# Patient Record
Sex: MALE | URBAN - METROPOLITAN AREA
[De-identification: names, ages, dates, MRNs, and addresses within clinical notes are randomized per-mention and may not be internally consistent; named-entity substitution may affect disease eponyms.]

---

## 2021-05-13 ENCOUNTER — EMERGENCY (EMERGENCY)
Facility: HOSPITAL | Age: 41
LOS: 1 days | Discharge: HOME | End: 2021-05-13
Attending: EMERGENCY MEDICINE | Admitting: EMERGENCY MEDICINE
Payer: OTHER MISCELLANEOUS

## 2021-05-13 VITALS
HEIGHT: 66 IN | DIASTOLIC BLOOD PRESSURE: 70 MMHG | TEMPERATURE: 98 F | OXYGEN SATURATION: 96 % | RESPIRATION RATE: 18 BRPM | HEART RATE: 69 BPM | SYSTOLIC BLOOD PRESSURE: 117 MMHG | WEIGHT: 139.99 LBS
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
## 2021-05-13 DIAGNOSIS — M25.522 PAIN IN LEFT ELBOW: ICD-10-CM
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
## 2021-05-13 PROCEDURE — 73080 X-RAY EXAM OF ELBOW: CPT | Mod: 26,LT

## 2021-05-13 PROCEDURE — 99283 EMERGENCY DEPT VISIT LOW MDM: CPT

## 2021-05-13 NOTE — ED PROVIDER NOTE - CLINICAL SUMMARY MEDICAL DECISION MAKING FREE TEXT BOX
Medial epicondylitis vs ligament sprain vs tendon strain vs tear. Will recommend RICE and perform XR to r/o any bony involvement. If XR is WNL, will have care coordinator follow up with orthopedic to discuss further evaluation and possible MRI.

## 2021-05-13 NOTE — ED PROVIDER NOTE - OBJECTIVE STATEMENT
41 year old Male with no known PMHx who is right hand dominate presents to the ED reporting pain to the internal aspect the left elbow. Pt reports that today while at work he attempted to pull a very heavy door shut but felt a "tearing" sensation to the affected elbow. Pain is caused by certain movements that occasionally radiates to the shoulder and tingling down to the entire hand. Pt does not smoke.

## 2021-05-13 NOTE — ED PROVIDER NOTE - CARE PROVIDER_API CALL
John Ortiz)  Orthopaedic Surgery  [address], [address] [place] [address]  Phone: [phone]  Fax: [phone]  Follow Up Time: 4-6 Days

## 2021-05-13 NOTE — ED PROVIDER NOTE - PHYSICAL EXAMINATION
GEN: Well appearing, well nourished, awake, alert, oriented to person, place, time/situation and in no apparent distress.  ENT: Airway patent, Nasal mucosa clear. Mouth with normal mucosa.  EYES: Clear bilaterally.  RESPIRATORY: Breathing comfortably with normal RR.  MSK: Range of motion is not limited, no deformities noted. LUE: normal ROM of all joint. Rotator cuff intact. Mild TTP over the internal epicondyle of the elbow. All nerves of the brachial plexus intact to the motor and sensation. +2 RP. Brisk capillary refill throughout.   NEURO: Alert and oriented, no focal deficits.  SKIN: Skin normal color for race, warm, dry and intact. No evidence of rash.  PSYCH: Alert and oriented to person, place, time/situation. normal mood and affect. no apparent risk to self or others.

## 2021-05-13 NOTE — ED PROVIDER NOTE - PATIENT PORTAL LINK FT
You can access the FollowMyHealth Patient Portal offered by [facility] by registering at the following website: [email or web address]. By joining [employer]’s FollowMyHealth portal, you will also be able to view your health information using other applications (apps) compatible with our system.

## 2021-05-13 NOTE — ED PROVIDER NOTE - NSFOLLOWUPINSTRUCTIONS_ED_ALL_ED_FT
PLEASE FOLLOW-UP WITH AN ORTHOPEDIST.  YOU MAY NEED AN MRI TO FURTHER EVALUATE YOUR ELBOW.      PLEASE REST THE LEFT ARM AS MUCH AS POSSIBLE.    -TAKE OVER THE COUNTER IBUPROFEN 400-600MG BY MOUTH EVERY 8 HOURS AS NEEDED FOR PAIN.  BE SURE TO TAKE WITH FOOD OR MILK AS THIS MEDICATION CAN CAUSE STOMACH IRRITATION.

## 2021-05-13 NOTE — ED PROVIDER NOTE - CARE PROVIDERS DIRECT ADDRESSES
[email or web address]

## 2021-05-13 NOTE — ED ADULT TRIAGE NOTE - CHIEF COMPLAINT QUOTE
pt. c/o pain to the left arm ranging from the shoulder to the hand. Pt. states he does a lot of heavy lifting and pulling at work and it started while he was at work yesterday. No visible injuries noted.

## 2021-05-17 PROBLEM — Z00.00 ENCOUNTER FOR PREVENTIVE HEALTH EXAMINATION: Status: ACTIVE | Noted: 2021-05-17

## 2021-05-21 ENCOUNTER — APPOINTMENT (OUTPATIENT)
Dept: ORTHOPEDIC SURGERY | Facility: CLINIC | Age: 41
End: 2021-05-21

## 2022-08-09 ENCOUNTER — APPOINTMENT (OUTPATIENT)
Dept: ORTHOPEDIC SURGERY | Facility: CLINIC | Age: 42
End: 2022-08-09

## 2022-08-19 ENCOUNTER — APPOINTMENT (OUTPATIENT)
Dept: ORTHOPEDIC SURGERY | Facility: CLINIC | Age: 42
End: 2022-08-19

## 2022-08-19 VITALS — BODY MASS INDEX: 22.5 KG/M2 | WEIGHT: 140 LBS | HEIGHT: 66 IN

## 2022-08-19 DIAGNOSIS — S46.912D STRAIN OF UNSPECIFIED MUSCLE, FASCIA AND TENDON AT SHOULDER AND UPPER ARM LEVEL, LEFT ARM, SUBSEQUENT ENCOUNTER: ICD-10-CM

## 2022-08-19 PROCEDURE — 99213 OFFICE O/P EST LOW 20 MIN: CPT

## 2022-08-19 PROCEDURE — 99072 ADDL SUPL MATRL&STAF TM PHE: CPT

## 2022-08-19 NOTE — HISTORY OF PRESENT ILLNESS
[de-identified] : Patient Complaint - left elbow and shoulder pain is improving He has finished therapy at [facility] sharp pain resolving in the\par shoulder and elbow \par still feels weak going overhead with his arm and weak feeling in his left forearm has gotten approved for some more\par therapy for the forearm taking Mobic on occasion

## 2022-08-19 NOTE — REASON FOR VISIT
[FreeTextEntry2] : patient returns for work injury of 5/12/21 to his left arm\par Did return to work 05/18/2022 full duty still some difficulty sleeping on the arm little pain on occasion not taking anti-inflammatory no therapy

## 2022-08-19 NOTE — IMAGING
[de-identified] :   cervical spine good range of motion no spasm left shoulder mild limits in elevation rotation impingement no guarding no pain on forced adduction still little weakness in rotation\par \par Left elbow good motion medial epicondylar pain negative Tinel no laxity mild decrease in  strength

## 2022-08-19 NOTE — ASSESSMENT
[FreeTextEntry1] :  now back working full duty still some residual symptoms talk about heating pad Ace wrap Advil at this time has a mild partial temporary disability all continue to work full duty